# Patient Record
Sex: MALE | Race: WHITE | Employment: UNEMPLOYED | URBAN - METROPOLITAN AREA
[De-identification: names, ages, dates, MRNs, and addresses within clinical notes are randomized per-mention and may not be internally consistent; named-entity substitution may affect disease eponyms.]

---

## 2024-10-23 ENCOUNTER — HOSPITAL ENCOUNTER (EMERGENCY)
Facility: CLINIC | Age: 29
Discharge: HOME | End: 2024-10-23
Attending: STUDENT IN AN ORGANIZED HEALTH CARE EDUCATION/TRAINING PROGRAM
Payer: MEDICAID

## 2024-10-23 VITALS
DIASTOLIC BLOOD PRESSURE: 88 MMHG | BODY MASS INDEX: 37.74 KG/M2 | HEART RATE: 104 BPM | HEIGHT: 68 IN | SYSTOLIC BLOOD PRESSURE: 131 MMHG | TEMPERATURE: 98.7 F | WEIGHT: 249 LBS | OXYGEN SATURATION: 96 % | RESPIRATION RATE: 16 BRPM

## 2024-10-23 DIAGNOSIS — J06.9 UPPER RESPIRATORY TRACT INFECTION, UNSPECIFIED TYPE: Primary | ICD-10-CM

## 2024-10-23 PROCEDURE — 6370000000 HC RX 637 (ALT 250 FOR IP)

## 2024-10-23 PROCEDURE — 99283 EMERGENCY DEPT VISIT LOW MDM: CPT

## 2024-10-23 RX ORDER — AZITHROMYCIN 250 MG/1
TABLET, FILM COATED ORAL
Qty: 1 PACKET | Refills: 0 | Status: SHIPPED | OUTPATIENT
Start: 2024-10-23 | End: 2024-10-27

## 2024-10-23 RX ORDER — AZITHROMYCIN 250 MG/1
500 TABLET, FILM COATED ORAL ONCE
Status: COMPLETED | OUTPATIENT
Start: 2024-10-23 | End: 2024-10-23

## 2024-10-23 RX ADMIN — AZITHROMYCIN DIHYDRATE 500 MG: 250 TABLET ORAL at 21:35

## 2024-10-23 ASSESSMENT — LIFESTYLE VARIABLES
HOW MANY STANDARD DRINKS CONTAINING ALCOHOL DO YOU HAVE ON A TYPICAL DAY: PATIENT DOES NOT DRINK
HOW OFTEN DO YOU HAVE A DRINK CONTAINING ALCOHOL: NEVER

## 2024-10-24 NOTE — ED PROVIDER NOTES
[facility]  EMERGENCY DEPARTMENT ENCOUNTER  INDEPENDENT ATTESTATION         1. Upper respiratory tract infection, unspecified type          Pt Name: Lake Armstrong  MRN: [id number]  Birthdate 1995  Date of evaluation: 10/23/24    Lake Armstrong is a 29 y.o. male who presents with Ear Pain (Throat and ear pain for past 5 days)  .    Based on the medical record, the care appears appropriate. I was personally available for consultation in the Emergency Department.      FINAL IMPRESSION      1. Upper respiratory tract infection, unspecified type          DISPOSITION / PLAN     DISPOSITION Decision To Discharge 10/23/2024 09:25:48 PM           PATIENT REFERRED TO:  lFip Kurtz MD  [address] [facility] [address] [phone]    Call in 3 days  As needed, If symptoms worsen      DISCHARGE MEDICATIONS:  New Prescriptions    AZITHROMYCIN (ZITHROMAX Z-KEITH) 250 MG TABLET    Take 2 tablets (500 mg) on Day 1, and then take 1 tablet (250 mg) on days 2 through 5.       Dr. Enedelia Reveles DO   Attending Emergency Physician       Enedelia Reveles DO  10/23/24 [id number]

## 2024-10-24 NOTE — ED PROVIDER NOTES
[facility]  [address] [id number]  Phone: [phone]        Pt Name: Lake Armstrong  MRN: [id number]  Birthdate 1995  Date of evaluation: 10/23/24    CHIEFCOMPLAINT       Chief Complaint   Patient presents with    Ear Pain     Throat and ear pain for past 5 days       HISTORY OF PRESENT ILLNESS (Location/Symptom, Timing/Onset, Context/Setting, Quality, Duration, Modifying Factors, Severity)      Lake Armstrong is a 29 y.o. male with no pertinent PMH who presents to the ED via private auto with complaints of 1 week duration of URI, nasal congestion, rhinorrhea, sore throat, headache, earache.  Course of illness is unchanged.  No nausea vomiting or diarrhea no respiratory distress    PAST MEDICAL / SURGICAL / SOCIAL / FAMILY HISTORY     PMH:  has no past medical history on file.  Surgical History:  has no past surgical history on file.  Social History:  reports that he has never smoked. He has never used smokeless tobacco. He reports that he does not drink alcohol and does not use drugs.  Family History: has no family status information on file.    family history is not on file.  Psychiatric History: None    Allergies: Patient has no known allergies.    Home Medications:   Prior to Admission medications    Medication Sig Start Date End Date Taking? Authorizing Provider   azithromycin (ZITHROMAX Z-KEITH) 250 MG tablet Take 2 tablets (500 mg) on Day 1, and then take 1 tablet (250 mg) on days 2 through 5. 10/23/24 10/27/24 Yes Aga Velasco APRN - CNP       REVIEW OF SYSTEMS  (2-9 systems for level 4, 10 ormore for level 5)      Review of Systems  See HPI.    PHYSICAL EXAM  (up to 7 for level 4, 8 or more for level 5)      INITIAL VITALS:  height is 1.727 m (5' 8\") and weight is 112.9 kg (249 lb). His oral temperature is 98.7 °F (37.1 °C). His blood pressure is 131/88 and his pulse is 104 (abnormal). His respiration is 16 and oxygen saturation is 96%.      Vital signs

## 2025-01-28 ENCOUNTER — HOSPITAL ENCOUNTER (EMERGENCY)
Facility: CLINIC | Age: 30
Discharge: HOME | End: 2025-01-28
Attending: EMERGENCY MEDICINE
Payer: MEDICAID

## 2025-01-28 VITALS
DIASTOLIC BLOOD PRESSURE: 89 MMHG | HEART RATE: 95 BPM | TEMPERATURE: 98.1 F | SYSTOLIC BLOOD PRESSURE: 134 MMHG | BODY MASS INDEX: 38.34 KG/M2 | WEIGHT: 253 LBS | RESPIRATION RATE: 17 BRPM | HEIGHT: 68 IN | OXYGEN SATURATION: 97 %

## 2025-01-28 DIAGNOSIS — J10.1 INFLUENZA A: Primary | ICD-10-CM

## 2025-01-28 LAB
FLUAV AG SPEC QL: POSITIVE
FLUBV AG SPEC QL: NEGATIVE
SARS-COV-2 RDRP RESP QL NAA+PROBE: NOT DETECTED
SPECIMEN DESCRIPTION: NORMAL

## 2025-01-28 PROCEDURE — 99283 EMERGENCY DEPT VISIT LOW MDM: CPT

## 2025-01-28 PROCEDURE — 87804 INFLUENZA ASSAY W/OPTIC: CPT

## 2025-01-28 PROCEDURE — 87635 SARS-COV-2 COVID-19 AMP PRB: CPT

## 2025-01-28 RX ORDER — DEXTROMETHORPHAN HYDROBROMIDE AND PROMETHAZINE HYDROCHLORIDE 15; 6.25 MG/5ML; MG/5ML
5 SYRUP ORAL 4 TIMES DAILY PRN
Qty: 118 ML | Refills: 0 | Status: SHIPPED | OUTPATIENT
Start: 2025-01-28 | End: 2025-02-04

## 2025-01-28 RX ORDER — PREDNISONE 20 MG/1
40 TABLET ORAL DAILY
Qty: 10 TABLET | Refills: 0 | Status: SHIPPED | OUTPATIENT
Start: 2025-01-28 | End: 2025-02-02

## 2025-01-28 RX ORDER — COLCHICINE 0.6 MG/1
0.6 TABLET ORAL 3 TIMES DAILY
Qty: 6 TABLET | Refills: 0 | Status: SHIPPED | OUTPATIENT
Start: 2025-01-28 | End: 2025-01-30

## 2025-01-28 ASSESSMENT — ENCOUNTER SYMPTOMS
SHORTNESS OF BREATH: 0
SORE THROAT: 1
DIARRHEA: 0
VOMITING: 0
COUGH: 1

## 2025-01-28 ASSESSMENT — PAIN SCALES - WONG BAKER: WONGBAKER_NUMERICALRESPONSE: HURTS A LITTLE BIT

## 2025-01-28 ASSESSMENT — PAIN - FUNCTIONAL ASSESSMENT: PAIN_FUNCTIONAL_ASSESSMENT: WONG-BAKER FACES

## 2025-01-28 NOTE — ED PROVIDER NOTES
is alert. Mental status is at baseline.      Motor: No weakness.      Comments: Speaking normally. No facial asymmetry. Moving all 4 extremities. Normal gait.    Psychiatric:         Mood and Affect: Mood normal.         EMERGENCY DEPARTMENT COURSE and DIFFERENTIAL DIAGNOSIS/MDM:   Vitals:    Vitals:    01/28/25 1659   BP: 134/89   Pulse: 95   Resp: 17   Temp: 98.1 °F (36.7 °C)   SpO2: 97%   Weight: 114.8 kg (253 lb)   Height: 1.727 m (5' 8\")       Patient presents to the emergency department with a complaint described above.  Vital signs are grossly normal, the patient is nontoxic, resting comfortably, no distress.  Going to get some viral testing and reassess      DIAGNOSTIC RESULTS     LABS:  Labs Reviewed   RAPID INFLUENZA A/B ANTIGENS - Abnormal; Notable for the following components:       Result Value    Flu A Antigen POSITIVE (*)     All other components within normal limits   COVID-19, RAPID       All other labs were within normal range or not returned as of this dictation.    RADIOLOGY:  No orders to display         ED Course as of 01/28/25 1752 Tue Jan 28, 2025   1750 Positive influenza A.  Putting the patient on a short course of steroid and anti-inflammatory, have discussed follow-up and return to ER information.    At this time the patient is without objective evidence of an acute process requiring hospitalization or inpatient management. They have remained hemodynamically stable and are stable for discharge with outpatient follow-up.     Standard anticipatory guidance given to patient upon discharge.  Have given them a specific time frame in which to follow-up and who to follow-up with.  I have also advised them that they should return to the emergency department if they get worse, or not getting better or develop any new or concerning symptoms.  Patient demonstrates understanding.   [TS]      ED Course User Index  [TS] French Barron DO         PROCEDURES:  Unless otherwise noted below, none